# Patient Record
Sex: MALE | Race: WHITE | NOT HISPANIC OR LATINO | URBAN - METROPOLITAN AREA
[De-identification: names, ages, dates, MRNs, and addresses within clinical notes are randomized per-mention and may not be internally consistent; named-entity substitution may affect disease eponyms.]

---

## 2022-01-17 ENCOUNTER — HISTORICAL (OUTPATIENT)
Dept: ADMINISTRATIVE | Facility: HOSPITAL | Age: 47
End: 2022-01-17

## 2022-02-09 ENCOUNTER — HISTORICAL (OUTPATIENT)
Dept: ADMINISTRATIVE | Facility: HOSPITAL | Age: 47
End: 2022-02-09

## 2023-11-17 ENCOUNTER — OFFICE VISIT (OUTPATIENT)
Dept: URGENT CARE | Facility: CLINIC | Age: 48
End: 2023-11-17
Payer: COMMERCIAL

## 2023-11-17 ENCOUNTER — TELEPHONE (OUTPATIENT)
Dept: URGENT CARE | Facility: CLINIC | Age: 48
End: 2023-11-17

## 2023-11-17 VITALS
BODY MASS INDEX: 25.92 KG/M2 | WEIGHT: 175 LBS | HEART RATE: 79 BPM | SYSTOLIC BLOOD PRESSURE: 126 MMHG | DIASTOLIC BLOOD PRESSURE: 78 MMHG | OXYGEN SATURATION: 100 % | TEMPERATURE: 98 F | HEIGHT: 69 IN | RESPIRATION RATE: 18 BRPM

## 2023-11-17 DIAGNOSIS — R22.42 LOCALIZED SWELLING OF LEFT LOWER LEG: Primary | ICD-10-CM

## 2023-11-17 PROCEDURE — 99203 OFFICE O/P NEW LOW 30 MIN: CPT | Mod: ,,,

## 2023-11-17 PROCEDURE — 99203 PR OFFICE/OUTPT VISIT, NEW, LEVL III, 30-44 MIN: ICD-10-PCS | Mod: ,,,

## 2023-11-17 NOTE — LETTER
November 17, 2023      [facility]  [address] [address]  Phone: [phone]       Patient: Jacinto Morton   YOB: 1975  Date of Visit: 11/17/2023    To Whom It May Concern:    Live Morton  was at [facility] on 11/17/2023. The patient may return to work/school on 11/20/2023 with no restrictions. If you have any questions or concerns, or if I can be of further assistance, please do not hesitate to contact me.    Sincerely,    Gustavo Garcia MA

## 2023-11-17 NOTE — TELEPHONE ENCOUNTER
Called patient at this time to discuss ultrasound results reading Complex 5 cm fluid collection in the proximal medial calf with no DVT.  No answer, left a voicemail for him to call back.  Would like to speak to patient when he calls.

## 2023-11-17 NOTE — PATIENT INSTRUCTIONS
Go to [facility] imaging for further assessment of leg via ultrasound.  I will call you with results once received.

## 2023-11-17 NOTE — PROGRESS NOTES
"Subjective:      Patient ID: Jacinto Morton is a 48 y.o. male.    Vitals:  height is 5' 9" (1.753 m) and weight is 79.4 kg (175 lb). His temperature is 98.4 °F (36.9 °C). His blood pressure is 126/78 and his pulse is 79. His respiration is 18 and oxygen saturation is 100%.     Chief Complaint: Foot Swelling (Foot(left) pain/swelling popping, knot x1w)    Patient is a 48-year-old male that presents stating that he had had a knot behind his knee for a while, one-week ago felt a pop and the not moved to his calf and he had significant swelling to calf, yesterday woke up and now the swelling is to his left lower leg/ankle.  He denies any pain or loss of sensation to leg.  States he did have pain to the knee at 1st but it has gone away since knot moved.  Denies any trauma.        Musculoskeletal:  Negative for pain, trauma and pain with walking.      Objective:     Physical Exam   Constitutional: He is oriented to person, place, and time.   HENT:   Head: Normocephalic.   Cardiovascular: Normal rate and normal pulses.      Comments: Pedal pulses normal   Pulmonary/Chest: Effort normal.   Abdominal: Normal appearance.   Musculoskeletal:      Left ankle: He exhibits swelling. No tenderness.      Left lower leg: He exhibits swelling (lower leg above ankle). He exhibits no tenderness.   Neurological: He is alert and oriented to person, place, and time.   Skin: Capillary refill takes less than 2 seconds.   Psychiatric: His behavior is normal. Mood, judgment and thought content normal.       Assessment:     1. Localized swelling of left lower leg        Plan:     Probable Baker's cyst but sending patient for ultrasound to rule out more emergent causes due to lower leg swelling with no known injury.     Localized swelling of left lower leg  -     US Lower Extremity Veins Left; Future; Expected date: 11/17/2023    Go to [facility] for further assessment of leg via ultrasound.  I will call you with results once received.        "

## 2023-11-17 NOTE — TELEPHONE ENCOUNTER
Spoke to patient at this time about ultrasound results.  Explained that it was probably the Baker's cyst that popped last week and fluid is making its way down causing his lower leg to accumulate fluid.  No signs of infection at this time, no pain so we will hold off on antibiotics.  Strict ER precautions given and told to come back if he begins to have fever, redness, pain, signs of infection.  He agreed with plan, voiced understanding.